# Patient Record
Sex: FEMALE | Race: WHITE | NOT HISPANIC OR LATINO | Employment: FULL TIME | URBAN - METROPOLITAN AREA
[De-identification: names, ages, dates, MRNs, and addresses within clinical notes are randomized per-mention and may not be internally consistent; named-entity substitution may affect disease eponyms.]

---

## 2021-07-17 ENCOUNTER — HOSPITAL ENCOUNTER (EMERGENCY)
Facility: HOSPITAL | Age: 46
Discharge: HOME | End: 2021-07-17
Attending: EMERGENCY MEDICINE | Admitting: EMERGENCY MEDICINE
Payer: COMMERCIAL

## 2021-07-17 VITALS
HEART RATE: 95 BPM | DIASTOLIC BLOOD PRESSURE: 111 MMHG | TEMPERATURE: 98.7 F | SYSTOLIC BLOOD PRESSURE: 187 MMHG | RESPIRATION RATE: 18 BRPM | OXYGEN SATURATION: 97 %

## 2021-07-17 DIAGNOSIS — L08.9 SOFT TISSUE INFECTION: Primary | ICD-10-CM

## 2021-07-17 PROCEDURE — 99283 EMERGENCY DEPT VISIT LOW MDM: CPT

## 2021-07-17 PROCEDURE — 99284 EMERGENCY DEPT VISIT MOD MDM: CPT | Performed by: EMERGENCY MEDICINE

## 2021-07-17 RX ORDER — CLINDAMYCIN HYDROCHLORIDE 300 MG/1
300 CAPSULE ORAL 4 TIMES DAILY
Qty: 28 CAPSULE | Refills: 0 | Status: SHIPPED | OUTPATIENT
Start: 2021-07-17 | End: 2021-07-24

## 2021-07-17 RX ORDER — CITALOPRAM 10 MG/1
10 TABLET ORAL DAILY
COMMUNITY

## 2021-07-17 NOTE — ED PROVIDER NOTES
History  Chief Complaint   Patient presents with    Insect Bite - Marked Reaction     pt with insect bite to 2nd toe of right foot, states got it while in [facility] and told it could be a Bot fly and to seek further evaluation      45F with with no significant PMH presents the ED with pain and swelling the 2nd toe of her right foot after an apparent insect bite while in [facility] 2 weeks ago  She says that she went to urgent care 2 days ago where she was prescribed doxycycline and a Medrol Dosepak for suspected skin infection  After 4 doses of doxycycline patient feels that the pain and swelling have not improved  She called the  company last night who told her that this could be a bite from a bot fly and that she should look for larva in the toe which prompted her to come to the emergency department  Patient reports no fever, nausea, bowel or bladder changes, shortness of breath  The toe is not significantly painful on ambulation or movement of the toe  There is some tenderness when she touches it and she had pus Discharge from the toe last night  Prior to Admission Medications   Prescriptions Last Dose Informant Patient Reported? Taking? PREDNISONE PO   Yes Yes   Sig: Take by mouth Unknown dose pt specifies tapering dose   citalopram (CeleXA) 10 mg tablet   Yes Yes   Sig: Take 10 mg by mouth daily      Facility-Administered Medications: None       History reviewed  No pertinent past medical history  History reviewed  No pertinent surgical history  History reviewed  No pertinent family history  I have reviewed and agree with the history as documented      E-Cigarette/Vaping     E-Cigarette/Vaping Substances     Social History     Tobacco Use    Smoking status: Current Every Day Smoker     Types: Cigarettes    Smokeless tobacco: Never Used   Substance Use Topics    Alcohol use: Not Currently    Drug use: Not Currently        Review of Systems   Constitutional: Negative for chills and fever    HENT: Negative for ear pain and sore throat  Eyes: Negative for pain and visual disturbance  Respiratory: Negative for cough and shortness of breath  Cardiovascular: Negative for chest pain and palpitations  Gastrointestinal: Negative for abdominal pain and vomiting  Genitourinary: Negative for dysuria and hematuria  Musculoskeletal: Negative for arthralgias and back pain  Skin: Negative for color change and rash  Neurological: Negative for seizures and syncope  All other systems reviewed and are negative  Physical Exam  ED Triage Vitals [07/17/21 0945]   Temperature Pulse Respirations Blood Pressure SpO2   98 7 °F (37 1 °C) 95 18 (!) 187/111 97 %      Temp Source Heart Rate Source Patient Position - Orthostatic VS BP Location FiO2 (%)   Oral Monitor -- -- --      Pain Score       --             Orthostatic Vital Signs  Vitals:    07/17/21 0945   BP: (!) 187/111   Pulse: 95       Physical Exam  Vitals and nursing note reviewed  Constitutional:       General: She is not in acute distress  Appearance: She is well-developed  HENT:      Head: Normocephalic and atraumatic  Eyes:      Conjunctiva/sclera: Conjunctivae normal    Cardiovascular:      Rate and Rhythm: Normal rate and regular rhythm  Pulmonary:      Effort: Pulmonary effort is normal  No respiratory distress  Breath sounds: Normal breath sounds  Musculoskeletal:      Cervical back: Neck supple  Feet:    Skin:     General: Skin is warm and dry  Neurological:      Mental Status: She is alert           ED Medications  Medications - No data to display    Diagnostic Studies  Results Reviewed     None                 No orders to display         Procedures  Procedures      ED Course                                       MDM  Number of Diagnoses or Management Options  Soft tissue infection  Diagnosis management comments: 50yo F presents the ED with pain swelling to the right 2nd toe after a presumed insect bite while in [place] 2 weeks ago  Patient has been taking doxycycline for skin infection for 2 days  On exam there is no evidence of tenosynovitis, no pain with range of motion, the toe is not hot and is mildly swollen  Pain is not out of proportion  No evidence of extensive cellulitis  Patient is prescribed clindamycin and advised to follow up with Podiatry within 2 days  Disposition  Final diagnoses:   Soft tissue infection     Time reflects when diagnosis was documented in both MDM as applicable and the Disposition within this note     Time User Action Codes Description Comment    7/17/2021 11:01 AM Darlin Arce Add [L08 9] Soft tissue infection       ED Disposition     ED Disposition Condition Date/Time Comment    Discharge Stable Sat Jul 17, 2021 11:00 AM [address] discharge to home/self care  Follow-up Information     Follow up With Specialties Details Why Contact Info Additional Information    [facility] Schedule an appointment as soon as possible for a visit in 2 days  [address] [place] [phone]  [address], [facility]          Discharge Medication List as of 7/17/2021 11:11 AM      START taking these medications    Details   clindamycin (CLEOCIN) 300 MG capsule Take 1 capsule (300 mg total) by mouth 4 (four) times a day for 7 days, Starting Sat 7/17/2021, Until Sat 7/24/2021, Normal         CONTINUE these medications which have NOT CHANGED    Details   citalopram (CeleXA) 10 mg tablet Take 10 mg by mouth daily, Historical Med      PREDNISONE PO Take by mouth Unknown dose pt specifies tapering dose, Historical Med           No discharge procedures on file  PDMP Review     None           ED Provider  Attending physically available and evaluated [address]  I managed the patient along with the ED Attending      Electronically Signed by Lyn Diego, DO  07/17/21 1235

## 2021-07-17 NOTE — ED NOTES
Pt is on an antibiotic and a statin but does not know the names or doses at this time     Esteban Lawrence RN  07/17/21 8859

## 2021-07-19 NOTE — ED ATTENDING ATTESTATION
7/17/2021  IKeyla MD, saw and evaluated the patient  I have discussed the patient with the resident/non-physician practitioner and agree with the resident's/non-physician practitioner's findings, Plan of Care, and MDM as documented in the resident's/non-physician practitioner's note, except where noted  All available labs and Radiology studies were reviewed  I was present for key portions of any procedure(s) performed by the resident/non-physician practitioner and I was immediately available to provide assistance  At this point I agree with the current assessment done in the Emergency Department  I have conducted an independent evaluation of this patient a history and physical is as follows:    55 yo female with w/o significant PMH p/w right 2nd toe after visiting [place]  Reports mildxy pain, no systemic symptoms  No reason to be immunocompromised  Pt started on doxy and medrol dose pack two days ago (reports 4 doses of doxy) with no progression of symptoms but no improvement either  Pt specifically concerned for [facility] bite  On exam tow is mildly erythematous without significant warmth, there is some mild swelling without fluctuance, crepitus or pain out of proportion, there is no significant pain with passive ROM toe, less consistent with flexor tenosynovitis  There is a small scab  At this point there is nothing specifically suggestive of Bot fly and risks outweigh the benefits of probing what appears to be a very superficial wound with very minimal swelling  Given pt well apperace, lack of risk factors or systemic symptoms, we will continue antibiotics (change to clinda) recommend NSAIDs and refer to podiatry  Pt advised to have a low threshold to RTER for worsening symptoms or any new concerns       ED Course         Critical Care Time  Procedures